# Patient Record
Sex: FEMALE | Race: AMERICAN INDIAN OR ALASKA NATIVE
[De-identification: names, ages, dates, MRNs, and addresses within clinical notes are randomized per-mention and may not be internally consistent; named-entity substitution may affect disease eponyms.]

---

## 2018-01-04 ENCOUNTER — HOSPITAL ENCOUNTER (INPATIENT)
Dept: HOSPITAL 5 - ED | Age: 58
LOS: 4 days | Discharge: HOME | DRG: 871 | End: 2018-01-08
Attending: INTERNAL MEDICINE | Admitting: INTERNAL MEDICINE
Payer: COMMERCIAL

## 2018-01-04 DIAGNOSIS — Z82.49: ICD-10-CM

## 2018-01-04 DIAGNOSIS — Z85.028: ICD-10-CM

## 2018-01-04 DIAGNOSIS — J69.0: ICD-10-CM

## 2018-01-04 DIAGNOSIS — J44.0: ICD-10-CM

## 2018-01-04 DIAGNOSIS — A41.9: Primary | ICD-10-CM

## 2018-01-04 DIAGNOSIS — E87.6: ICD-10-CM

## 2018-01-04 DIAGNOSIS — F17.200: ICD-10-CM

## 2018-01-04 DIAGNOSIS — Z90.3: ICD-10-CM

## 2018-01-04 DIAGNOSIS — J96.01: ICD-10-CM

## 2018-01-04 DIAGNOSIS — Z85.43: ICD-10-CM

## 2018-01-04 DIAGNOSIS — J98.11: ICD-10-CM

## 2018-01-04 DIAGNOSIS — I10: ICD-10-CM

## 2018-01-04 LAB
ALBUMIN SERPL-MCNC: 3.3 G/DL (ref 3.9–5)
ALT SERPL-CCNC: 38 UNITS/L (ref 7–56)
ANISOCYTOSIS BLD QL SMEAR: (no result)
APTT BLD: 29.8 SEC. (ref 24.2–36.6)
BAND NEUTROPHILS # (MANUAL): 0 K/MM3
BILIRUB UR QL STRIP: (no result)
BLOOD UR QL VISUAL: (no result)
BUN SERPL-MCNC: 16 MG/DL (ref 7–17)
BUN/CREAT SERPL: 27 %
CALCIUM SERPL-MCNC: 8.9 MG/DL (ref 8.4–10.2)
HCT VFR BLD CALC: 38.2 % (ref 30.3–42.9)
HEMOLYSIS INDEX: 4
HGB BLD-MCNC: 13 GM/DL (ref 10.1–14.3)
INR PPP: 1.11 (ref 0.87–1.13)
MCH RBC QN AUTO: 28 PG (ref 28–32)
MCHC RBC AUTO-ENTMCNC: 34 % (ref 30–34)
MCV RBC AUTO: 84 FL (ref 79–97)
MUCOUS THREADS #/AREA URNS HPF: (no result) /HPF
MYELOCYTES # (MANUAL): 0 K/MM3
NITRITE UR QL STRIP: (no result)
PH UR STRIP: 5 [PH] (ref 5–7)
PLATELET # BLD: 332 K/MM3 (ref 140–440)
PROMYELOCYTES # (MANUAL): 0 K/MM3
PROT UR STRIP-MCNC: (no result) MG/DL
RBC # BLD AUTO: 4.58 M/MM3 (ref 3.65–5.03)
RBC #/AREA URNS HPF: 3 /HPF (ref 0–6)
TOTAL CELLS COUNTED BLD: 100
UROBILINOGEN UR-MCNC: < 2 MG/DL (ref ?–2)
WBC #/AREA URNS HPF: 3 /HPF (ref 0–6)

## 2018-01-04 PROCEDURE — 93010 ELECTROCARDIOGRAM REPORT: CPT

## 2018-01-04 PROCEDURE — 4A033R1 MEASUREMENT OF ARTERIAL SATURATION, PERIPHERAL, PERCUTANEOUS APPROACH: ICD-10-PCS | Performed by: INTERNAL MEDICINE

## 2018-01-04 PROCEDURE — 94760 N-INVAS EAR/PLS OXIMETRY 1: CPT

## 2018-01-04 PROCEDURE — 85007 BL SMEAR W/DIFF WBC COUNT: CPT

## 2018-01-04 PROCEDURE — 93005 ELECTROCARDIOGRAM TRACING: CPT

## 2018-01-04 PROCEDURE — 85610 PROTHROMBIN TIME: CPT

## 2018-01-04 PROCEDURE — 94640 AIRWAY INHALATION TREATMENT: CPT

## 2018-01-04 PROCEDURE — 82803 BLOOD GASES ANY COMBINATION: CPT

## 2018-01-04 PROCEDURE — 85379 FIBRIN DEGRADATION QUANT: CPT

## 2018-01-04 PROCEDURE — 80053 COMPREHEN METABOLIC PANEL: CPT

## 2018-01-04 PROCEDURE — 96366 THER/PROPH/DIAG IV INF ADDON: CPT

## 2018-01-04 PROCEDURE — 71275 CT ANGIOGRAPHY CHEST: CPT

## 2018-01-04 PROCEDURE — 83880 ASSAY OF NATRIURETIC PEPTIDE: CPT

## 2018-01-04 PROCEDURE — 84484 ASSAY OF TROPONIN QUANT: CPT

## 2018-01-04 PROCEDURE — 81001 URINALYSIS AUTO W/SCOPE: CPT

## 2018-01-04 PROCEDURE — 85730 THROMBOPLASTIN TIME PARTIAL: CPT

## 2018-01-04 PROCEDURE — 36415 COLL VENOUS BLD VENIPUNCTURE: CPT

## 2018-01-04 PROCEDURE — 85027 COMPLETE CBC AUTOMATED: CPT

## 2018-01-04 PROCEDURE — 85025 COMPLETE CBC W/AUTO DIFF WBC: CPT

## 2018-01-04 PROCEDURE — 71045 X-RAY EXAM CHEST 1 VIEW: CPT

## 2018-01-04 PROCEDURE — 87040 BLOOD CULTURE FOR BACTERIA: CPT

## 2018-01-04 PROCEDURE — 96365 THER/PROPH/DIAG IV INF INIT: CPT

## 2018-01-04 PROCEDURE — 80048 BASIC METABOLIC PNL TOTAL CA: CPT

## 2018-01-04 NOTE — EMERGENCY DEPARTMENT REPORT
HPI





- General


Chief Complaint: Dyspnea/Respdistress


Time Seen by Provider: 01/04/18 20:27





- HPI


HPI: 


This is a 57-year-old -American female presents to the emergency 

department by EMS from a [place] urgent care with complaint of shortness of 

breath, productive cough, increased fatigue.  The patient says that she was 

diagnosed with influenza 5 days ago at the same urgent care.  She went back 

there today because of the symptoms stated above.  She says that she had a 

chest x-ray and some other blood work done there and was diagnosed with 

pneumonia.  She was then sent here for further evaluation and treatment.  The 

patient has a past medical history of hypertension.  She is a tobacco smoker 

but says she has not had any cigarette in about 2 weeks.  She denies any 

history of COPD and is not oxygen dependent.  However the patient presents with 

some hypoxia with a pulse ox of 90 while on 2 L nasal cannula.  She denies any 

history of MI, CVA, PE/DVT.  No recent travel or sick contacts at home.  She 

says that she has a primary care physician but has not seen them in quite some 

time.








ED Review of Systems


ROS: 


Stated complaint: GAVINO


Other details as noted in HPI





Comment: All other systems reviewed and negative


Constitutional: other (fatigue).  denies: chills, fever


Eyes: denies: eye pain, eye discharge, vision change


ENT: denies: ear pain, dental pain


Respiratory: cough, shortness of breath, SOB with exertion


Cardiovascular: denies: chest pain, edema


Gastrointestinal: denies: abdominal pain, nausea, diarrhea


Genitourinary: denies: urgency, dysuria, discharge


Musculoskeletal: denies: back pain, joint swelling, arthralgia


Skin: denies: rash, lesions


Neurological: denies: headache, weakness, paresthesias





Physical Exam





- Physical Exam


Vital Signs: 


 Vital Signs











  01/04/18





  19:57


 


Temperature 99.2 F


 


Pulse Rate 90


 


Respiratory 20





Rate 


 


Blood Pressure 139/73


 


O2 Sat by Pulse 87





Oximetry 











Physical Exam: 


GENERAL: The patient is well-developed well-nourished.


HENT: Normocephalic.  Atraumatic.    Patient has moist mucous membranes.


EYES: Extraocular motions are intact.  Pupils equal reactive to light 

bilaterally.


NECK: Supple.  Trachea is midline.


CHEST/LUNGS: Clear to auscultation.  Mild tachypnea but no accessory muscle 

use.  Occasional productive cough heard during examination.  There is no 

respiratory distress noted.


HEART/CARDIOVASCULAR: Regular.  There is no tachycardia.  There is no murmur.


ABDOMEN: Abdomen is soft, nontender.  Patient has normal bowel sounds.  There 

is no abdominal distention.


SKIN: Skin is warm and dry.


NEURO: The patient is awake, alert, and oriented.  The patient is cooperative.  

The patient has no focal neurologic deficits.  The patient has normal speech.


MUSCULOSKELETAL: There is no tenderness or deformity.  There is no limitation 

range of motion.  There is no evidence of acute injury.








ED Course


 Vital Signs











  01/04/18





  19:57


 


Temperature 99.2 F


 


Pulse Rate 90


 


Respiratory 20





Rate 


 


Blood Pressure 139/73


 


O2 Sat by Pulse 87





Oximetry 














- ABG Interpretation


Ph: 7.558


PCO2: 28


PO2: 76


Bicarbonate: 25


Interpretation: respiratory alkalosis, other (hypoxia)





ED Medical Decision Making





- Lab Data


Result diagrams: 


 01/04/18 20:38





 01/04/18 20:45





- EKG Data


-: EKG Interpreted by Me


EKG shows normal: sinus rhythm, axis, intervals (slightly prolonged QTC), QRS 

complexes, ST-T waves


Rate: normal





- EKG Data


When compared to previous EKG there are: previous EKG unavailable


Interpretation: normal EKG (with slightly prolonged QTC)





- Radiology Data


Radiology results: report reviewed, image reviewed


interpreted by me: 


X-ray of the chest shows concern for left lower lobe infiltrate and/or 

pneumonia.  No obvious pleural effusion.  No pneumothorax.








PROCEDURE: CT ANGIO CHEST 





TECHNIQUE: Computerized tomographic angiography of the chest was 


performed after the IV injection of iodinated nonionic contrast 


including image processing. The image data was postprocessed 


using 2-dimensional multiplanar reformatted (MPR) and 


3-dimensional (MIP and/or volume rendered) techniques. 





HISTORY: SOB, elevated dimer 





COMPARISON: Chest radiograph of the same date 





FINDINGS: 


Heart and pericardium: Normal. 





Thoracic aorta: Normal. 





Pulmonary vasculature: Normal. 





Lymph nodes: No enlarged thoracic lymph nodes. 





Lungs: Infiltrate identified in the left lower lung. Slight 


reactive lung tissue with atelectasis right middle and lower 


lung. No effusion or pneumothorax. The central airway is patent.. 





Pleural space: No effusion, thickening, or pneumothorax. 





Musculoskeletal structures: No significant abnormality. 





Upper abdominal structures: No significant abnormality. 





IMPRESSION: 


Minimal infiltrate in the left lower lung with slight reactive 


lung tissue and atelectasis right middle and lower lung. No 


effusion or pneumothorax. 





There is no evidence of pulmonary arterial emboli. 











Transcribed By: [facility] 


Dictated By: BENNETT FANG MD 


Electronically Authenticated By: BENNETT FANG MD 


Signed Date/Time: 01/04/18 2028 





- Medical Decision Making


Patient presents with some shortness of breath, increased fatigue.  She has 

recent positive influenza.  Chest x-ray shows concern for left lower lung 

infiltrate and/or pneumonia.  She has a leukocytosis with left shift.  She had 

some hypoxia when she was first seen in the ER.  D-dimer elevated so CT 

angiography was done that does not show any PE or dissection but may also show 

left lower lobe pneumonia.  Culture sent and patient started on Levaquin.  I 

spoke to [place] and was given permission to keep the patient here for treatment 

by Dr. Ron.  She has been accepted for admission by the hospitalist, Dr. Vital.








- Differential Diagnosis


pneumonia, influenza, PE, URI


Critical Care Time: No


Critical care attestation.: 


If time is entered above; I have spent that time in minutes in the direct care 

of this critically ill patient, excluding procedure time.








ED Disposition


Clinical Impression: 


 Hypoxia, Hypokalemia





Pneumonia


Qualifiers:


 Pneumonia type: due to unspecified organism Laterality: left Lung location: 

lower lobe of lung Qualified Code(s): J18.1 - Lobar pneumonia, unspecified 

organism





Sepsis


Qualifiers:


 Sepsis type: sepsis due to unspecified organism Qualified Code(s): A41.9 - 

Sepsis, unspecified organism





Disposition: DC-09 OP ADMIT IP TO THIS HOSP


Is pt being admited?: Yes


Condition: Fair


Instructions:  Bacterial Pneumonia (ED)


Referrals: 


PASQUALE NORTH MD [Primary Care Provider] - 3-5 Days


Time of Disposition: 03:52

## 2018-01-04 NOTE — XRAY REPORT
FINAL REPORT



PROCEDURE:  XR CHEST 1V AP



TECHNIQUE:  Chest radiograph anteroposterior view. CPT 80370







HISTORY:  Dyspnea 



COMPARISON:  No prior studies are available for comparison.



FINDINGS:  

Heart: Normal.



Mediastinum/Vessels: Mild pulmonary venous congestion is noted..



Lungs/Pleural space: There is diffuse prominence of interstitial

markings. There appears to be an inhomogeneous density in the

left retrocardiac region. Pleural spaces are clear...



Bony thorax: No acute osseous abnormality.



Life support devices: None.



IMPRESSION:  

Mild pulmonary venous congestion.



An inhomogeneous density in the left retrocardiac region may

represent atelectatic versus infiltrative changes. A two view

chest study is recommended whenever the patient's condition

permits..

## 2018-01-05 RX ADMIN — IPRATROPIUM BROMIDE AND ALBUTEROL SULFATE SCH AMPUL: .5; 3 SOLUTION RESPIRATORY (INHALATION) at 20:46

## 2018-01-05 RX ADMIN — LEVOFLOXACIN SCH MLS/HR: 5 INJECTION, SOLUTION INTRAVENOUS at 23:42

## 2018-01-05 RX ADMIN — ENOXAPARIN SODIUM SCH MG: 100 INJECTION SUBCUTANEOUS at 09:30

## 2018-01-05 RX ADMIN — SODIUM CHLORIDE SCH MLS/HR: 0.9 INJECTION, SOLUTION INTRAVENOUS at 05:21

## 2018-01-05 RX ADMIN — IPRATROPIUM BROMIDE AND ALBUTEROL SULFATE SCH AMPUL: .5; 3 SOLUTION RESPIRATORY (INHALATION) at 07:29

## 2018-01-05 RX ADMIN — GUAIFENESIN AND CODEINE PHOSPHATE PRN ML: 100; 10 SOLUTION ORAL at 06:03

## 2018-01-05 RX ADMIN — IPRATROPIUM BROMIDE AND ALBUTEROL SULFATE SCH AMPUL: .5; 3 SOLUTION RESPIRATORY (INHALATION) at 13:26

## 2018-01-05 RX ADMIN — SODIUM CHLORIDE SCH MLS/HR: 0.9 INJECTION, SOLUTION INTRAVENOUS at 16:25

## 2018-01-05 RX ADMIN — GUAIFENESIN AND CODEINE PHOSPHATE PRN ML: 100; 10 SOLUTION ORAL at 19:59

## 2018-01-05 NOTE — HISTORY AND PHYSICAL REPORT
History of Present Illness


Date of examination: 01/05/18


History of present illness: 


58-year-old man with history of stomach cancer, ovarian cancer, emergency room 

with complaints of cough, nonproductive.  Her symptoms started on December 27, 

she was seen at [place] urgent care she was diagnosed with the flu and given 

Tamiflu and cough medications.  Her symptoms did not improve, she had 

generalized weakness, decreased energy and was unable to do her ADLs.  She 

returned to the doctor today, chest x-ray was done which shows pneumonia, she 

was also hypoxic and she was transferred from [place] facility here for admission


Review Of  Systems:


Constitutional: no weight loss


Ears, eyes, nose, mouth and throat: no nasal congestion, no nasal discharge, no 

sinus pressure, blurry vision, diplopia


Neck: No neck pain or rigidity.


Cardiovascular: No chest pain,  palpitations


Respiratory: + shortness of breath, cough


Gastrointestinal: No abdominal pain, hematochezia


Genitourinary : no dysuria, frequency , hematuria


Musculoskeletal: no muscle ache 


Integumentary: no rash, no pruritis


Neurological: no parathesias, focal weakness


Endocrine: no cold or heat intolerance, no polyuria or polydipsia


Hematologic/Lymphatic: no easy bruising, no easy bleeding, no gland swelling


Allergic/Immunologic: no urticaria, no angioedema.





PAST MEDICAL HISTORY:stomach cancer, ovarian cancer





PAST SURGICAL HISTORY: Partial gastrectomy, carpal tunnel release





FAMILY HISTORY: Hypertension





SOCIAL HISTORY: Patient has not smoked since she got sick, social alcohol, no 

drugs








Medications and Allergies


 Allergies











Allergy/AdvReac Type Severity Reaction Status Date / Time


 


No Known Allergies Allergy   Verified 01/05/18 03:01











Active Meds: 


Active Medications





Enoxaparin Sodium (Lovenox)  30 mg SUB-Q QDAY LUCIUS











Exam





- Physical Exam


Narrative exam: 


Gen. appearance: Patient lying in bed in no acute distress


HEENT: Normocephalic/atraumatic, pupils equal round reactive to light, extra 

occular movement intact, no scleral icterus, no JVD or thyromegaly or nodule, 

neck is supple, mucous membrane moist, no erythema or exudate


Heart: S1-S2, regular rate and rhythm


Lungs: Crackles breathing comfortable


Abdomen: Positive bowel sounds, nontender, nondistended, no organomegaly


Extremities: No edema, cyanosis, clubbing


Neuro:: Oriented 3 , cranial nerves II-12 intact, speech, motor intact


Skin: No rash, nodules, warm dry








- Constitutional


Vitals: 


 











Temp Pulse Resp BP Pulse Ox


 


 98.4 F   93 H  24   131/69   95 


 


 01/05/18 00:56  01/05/18 03:38  01/05/18 03:38  01/05/18 02:30  01/05/18 03:38














Results





- Labs


CBC & Chem 7: 


 01/04/18 20:38





 01/04/18 20:45


Labs: 


 Abnormal lab results











  01/04/18 01/04/18 01/04/18 Range/Units





  20:38 20:44 20:45 


 


WBC  15.1 H    (4.5-11.0)  K/mm3


 


Seg Neuts % (Manual)  90.0 H    (40.0-70.0)  %


 


Lymphocytes % (Manual)  7.0 L    (13.4-35.0)  %


 


Seg Neutrophils # Man  13.6 H    (1.8-7.7)  K/mm3


 


Lymphocytes # (Manual)  1.1 L    (1.2-5.4)  K/mm3


 


D-Dimer   758.89 H   (0-234)  ng/mlDDU


 


POC ABG pH     (7.35-7.45)  


 


POC ABG pCO2     (35-45)  


 


POC ABG pO2     ()  


 


Potassium    3.0 L  (3.6-5.0)  mmol/L


 


Chloride    92.3 L  ()  mmol/L


 


Creatinine    0.6 L  (0.7-1.2)  mg/dL


 


Glucose    129 H  ()  mg/dL


 


AST    44 H  (5-40)  units/L


 


Albumin    3.3 L  (3.9-5)  g/dL














  01/04/18 Range/Units





  21:08 


 


WBC   (4.5-11.0)  K/mm3


 


Seg Neuts % (Manual)   (40.0-70.0)  %


 


Lymphocytes % (Manual)   (13.4-35.0)  %


 


Seg Neutrophils # Man   (1.8-7.7)  K/mm3


 


Lymphocytes # (Manual)   (1.2-5.4)  K/mm3


 


D-Dimer   (0-234)  ng/mlDDU


 


POC ABG pH  7.558 H  (7.35-7.45)  


 


POC ABG pCO2  28.6 L  (35-45)  


 


POC ABG pO2  76 L  ()  


 


Potassium   (3.6-5.0)  mmol/L


 


Chloride   ()  mmol/L


 


Creatinine   (0.7-1.2)  mg/dL


 


Glucose   ()  mg/dL


 


AST   (5-40)  units/L


 


Albumin   (3.9-5)  g/dL














- Imaging and Cardiology


EKG: image reviewed


Chest x-ray: image reviewed





Assessment and Plan


Assessment


Sepsis


Pneumonia


History of ovarian stomach cancer





Plan


Admit to medicine


Start IV fluids, IV antibiotic, follow cultures


Continued appropriate outpatient medications


DVT Prophylaxis

## 2018-01-05 NOTE — CAT SCAN REPORT
FINAL REPORT



PROCEDURE:  CT ANGIO CHEST



TECHNIQUE:  Computerized tomographic angiography of the chest was

performed after the IV injection of iodinated nonionic contrast

including image processing. The image data was postprocessed

using 2-dimensional multiplanar reformatted (MPR) and

3-dimensional (MIP and/or volume rendered) techniques. 



HISTORY:  SOB, elevated dimer 



COMPARISON:  Chest radiograph of the same date



FINDINGS:  

Heart and pericardium: Normal.



Thoracic aorta: Normal.



Pulmonary vasculature: Normal.



Lymph nodes: No enlarged thoracic lymph nodes.



Lungs: Infiltrate identified in the left lower lung. Slight

reactive lung tissue with atelectasis right middle and lower

lung. No effusion or pneumothorax. The central airway is patent..



Pleural space: No effusion, thickening, or pneumothorax.



Musculoskeletal structures: No significant abnormality.



Upper abdominal structures: No significant abnormality.



IMPRESSION:  

Minimal infiltrate in the left lower lung with slight reactive

lung tissue and atelectasis right middle and lower lung. No

effusion or pneumothorax.



There is no evidence of pulmonary arterial emboli.

## 2018-01-05 NOTE — PROGRESS NOTE
Assessment and Plan


Assessment and plan: 


Patient is 58-year-old woman with history of hypertension, ovarian and stomach 

cancer in remission who present from [place] urgent care with sob and cough.  

Patient had the Flu and was treated with Tamiflu, 1-2 weeks prior to 

presentation.





Chest x-ray reported as mild pulmonary vascular congestion


CTA of the chest reported as minimal infiltrate in the left lower lung with 

slight reactive lung tissue and atelectasis right middle and lower lobe.  No 

effusion or pneumothorax, there is no evidence of pulmonary artery emboli.





-Acute hypoxic respiratory failure, patient was on 85% on room air at rest: 

Treatment will oxygen


-Sepsis bilateral aspiration pneumonia: Careful with IV fluids due to venous 

pulmonary congestion, follow cultures


-Left lower lobe pneumonia:  Treatment with antibiotics 


-Hypokalemia: Replace and recheck in a.m.


-DVT prophylaxis: Subcutaneous Lovenox











History


Interval history: 


Patient was seen and examined.  Follow-up on current diagnosis.  Overnight 

uneventful.  Patient denies any chest pain, shortness breath, nausea/vomiting 

or severe headaches.  Imaging, nursing note, chart, labs and old chart 

reviewed.  Discussed with patient.








Hospitalist Physical





- Physical exam


Narrative exam: 


GEN: WDWN, NAD, AWAKE, ALERT, ORIENTATED 3


HEENT: NCAT, EOMI, PERRL, OP Clear


NECK: supple, no adenopathy, no thyromegaly, no JVD


CVS/HEART: RRR, NORMAL S1S2, NO JVD, pulses present bilaterally


CHEST/LUNGS: Crackles bilaterally, Symmetrical chest expansion, good air entry 

bilaterally


GI/Abdomen: soft, NTND, good bowel sounds, no guarding or rebound


/Bladder: no suprapubic tenderness, no CVA or paraspinal tenderness


EXT/Skin: no c/c/e, no obvious rash


MSK: FROM x 4


Neuro: CN 2-12 grossly intact, no new focal deficits


Psych: calm








- Constitutional


Vitals: 


 











Temp Pulse Resp BP Pulse Ox


 


 98.5 F   88   18   138/59   85 


 


 01/05/18 08:21  01/05/18 08:21  01/05/18 08:21  01/05/18 08:21  01/05/18 08:21














Results





- Labs


CBC & Chem 7: 


 01/04/18 20:38





 01/04/18 20:45


Labs: 


 Laboratory Last Values











WBC  15.1 K/mm3 (4.5-11.0)  H  01/04/18  20:38    


 


RBC  4.58 M/mm3 (3.65-5.03)   01/04/18  20:38    


 


Hgb  13.0 gm/dl (10.1-14.3)   01/04/18  20:38    


 


Hct  38.2 % (30.3-42.9)   01/04/18  20:38    


 


MCV  84 fl (79-97)   01/04/18  20:38    


 


MCH  28 pg (28-32)   01/04/18  20:38    


 


MCHC  34 % (30-34)   01/04/18  20:38    


 


RDW  13.5 % (13.2-15.2)   01/04/18  20:38    


 


Plt Count  332 K/mm3 (140-440)   01/04/18  20:38    


 


Add Manual Diff  Complete   01/04/18  20:38    


 


Total Counted  100   01/04/18  20:38    


 


Seg Neutrophils %  Np   01/04/18  20:38    


 


Seg Neuts % (Manual)  90.0 % (40.0-70.0)  H  01/04/18  20:38    


 


Band Neutrophils %  0 %  01/04/18  20:38    


 


Lymphocytes % (Manual)  7.0 % (13.4-35.0)  L  01/04/18  20:38    


 


Reactive Lymphs % (Man)  0 %  01/04/18  20:38    


 


Monocytes % (Manual)  3.0 % (0.0-7.3)   01/04/18  20:38    


 


Eosinophils % (Manual)  0 % (0.0-4.3)   01/04/18  20:38    


 


Basophils % (Manual)  0 % (0.0-1.8)   01/04/18  20:38    


 


Metamyelocytes %  0 %  01/04/18  20:38    


 


Myelocytes %  0 %  01/04/18  20:38    


 


Promyelocytes %  0 %  01/04/18  20:38    


 


Blast Cells %  0 %  01/04/18  20:38    


 


Nucleated RBC %  Not Reportable   01/04/18  20:38    


 


Seg Neutrophils # Man  13.6 K/mm3 (1.8-7.7)  H  01/04/18  20:38    


 


Band Neutrophils #  0.0 K/mm3  01/04/18  20:38    


 


Lymphocytes # (Manual)  1.1 K/mm3 (1.2-5.4)  L  01/04/18  20:38    


 


Abs React Lymphs (Man)  0.0 K/mm3  01/04/18  20:38    


 


Monocytes # (Manual)  0.5 K/mm3 (0.0-0.8)   01/04/18  20:38    


 


Eosinophils # (Manual)  0.0 K/mm3 (0.0-0.4)   01/04/18  20:38    


 


Basophils # (Manual)  0.0 K/mm3 (0.0-0.1)   01/04/18  20:38    


 


Metamyelocytes #  0.0 K/mm3  01/04/18  20:38    


 


Myelocytes #  0.0 K/mm3  01/04/18  20:38    


 


Promyelocytes #  0.0 K/mm3  01/04/18  20:38    


 


Blast Cells #  0.0 K/mm3  01/04/18  20:38    


 


WBC Morphology  Not Reportable   01/04/18  20:38    


 


Hypersegmented Neuts  Not Reportable   01/04/18  20:38    


 


Hyposegmented Neuts  Not Reportable   01/04/18  20:38    


 


Hypogranular Neuts  Not Reportable   01/04/18  20:38    


 


Smudge Cells  Not Reportable   01/04/18  20:38    


 


Toxic Granulation  Not Reportable   01/04/18  20:38    


 


Toxic Vacuolation  Not Reportable   01/04/18  20:38    


 


Dohle Bodies  Not Reportable   01/04/18  20:38    


 


Pelger-Huet Anomaly  Not Reportable   01/04/18  20:38    


 


Jimbo Rods  Not Reportable   01/04/18  20:38    


 


Platelet Estimate  Appears normal   01/04/18  20:38    


 


Clumped Platelets  Not Reportable   01/04/18  20:38    


 


Plt Clumps, EDTA  Not Reportable   01/04/18  20:38    


 


Large Platelets  Not Reportable   01/04/18  20:38    


 


Giant Platelets  Not Reportable   01/04/18  20:38    


 


Platelet Satelliting  Not Reportable   01/04/18  20:38    


 


Plt Morphology Comment  Not Reportable   01/04/18  20:38    


 


RBC Morphology  Not Reportable   01/04/18  20:38    


 


Dimorphic RBCs  Not Reportable   01/04/18  20:38    


 


Polychromasia  Not Reportable   01/04/18  20:38    


 


Hypochromasia  Not Reportable   01/04/18  20:38    


 


Poikilocytosis  Not Reportable   01/04/18  20:38    


 


Anisocytosis  Rare   01/04/18  20:38    


 


Microcytosis  Not Reportable   01/04/18  20:38    


 


Macrocytosis  Not Reportable   01/04/18  20:38    


 


Spherocytes  Not Reportable   01/04/18  20:38    


 


Pappenheimer Bodies  Not Reportable   01/04/18  20:38    


 


Sickle Cells  Not Reportable   01/04/18  20:38    


 


Target Cells  Not Reportable   01/04/18  20:38    


 


Tear Drop Cells  Not Reportable   01/04/18  20:38    


 


Ovalocytes  Not Reportable   01/04/18  20:38    


 


Helmet Cells  Not Reportable   01/04/18  20:38    


 


Guardado-Conway Bodies  Not Reportable   01/04/18  20:38    


 


Cabot Rings  Not Reportable   01/04/18  20:38    


 


Luly Cells  Not Reportable   01/04/18  20:38    


 


Bite Cells  Not Reportable   01/04/18  20:38    


 


Crenated Cell  Not Reportable   01/04/18  20:38    


 


Elliptocytes  Not Reportable   01/04/18  20:38    


 


Acanthocytes (Spur)  Not Reportable   01/04/18  20:38    


 


Rouleaux  Not Reportable   01/04/18  20:38    


 


Hemoglobin C Crystals  Not Reportable   01/04/18  20:38    


 


Schistocytes  Not Reportable   01/04/18  20:38    


 


Malaria parasites  Not Reportable   01/04/18  20:38    


 


Yayo Bodies  Not Reportable   01/04/18  20:38    


 


Hem Pathologist Commnt  No   01/04/18  20:38    


 


PT  14.9 Sec. (12.2-14.9)   01/04/18  20:44    


 


INR  1.11  (0.87-1.13)   01/04/18  20:44    


 


APTT  29.8 Sec. (24.2-36.6)   01/04/18  20:44    


 


D-Dimer  758.89 ng/mlDDU (0-234)  H  01/04/18  20:44    


 


POC ABG pH  7.558  (7.35-7.45)  H  01/04/18  21:08    


 


POC ABG pCO2  28.6  (35-45)  L  01/04/18  21:08    


 


POC ABG pO2  76  ()  L  01/04/18  21:08    


 


POC ABG HCO3  25.5   01/04/18  21:08    


 


POC ABG Total CO2  26   01/04/18  21:08    


 


POC ABG O2 Sat  97   01/04/18  21:08    


 


POC ABG Base Excess  3   01/04/18  21:08    


 


FiO2  28 %  01/04/18  21:08    


 


Sodium  138 mmol/L (137-145)   01/04/18  20:45    


 


Potassium  3.0 mmol/L (3.6-5.0)  L  01/04/18  20:45    


 


Chloride  92.3 mmol/L ()  L  01/04/18  20:45    


 


Carbon Dioxide  24 mmol/L (22-30)   01/04/18  20:45    


 


Anion Gap  25 mmol/L  01/04/18  20:45    


 


BUN  16 mg/dL (7-17)   01/04/18  20:45    


 


Creatinine  0.6 mg/dL (0.7-1.2)  L  01/04/18  20:45    


 


Estimated GFR  > 60 ml/min  01/04/18  20:45    


 


BUN/Creatinine Ratio  27 %  01/04/18  20:45    


 


Glucose  129 mg/dL ()  H  01/04/18  20:45    


 


Calcium  8.9 mg/dL (8.4-10.2)   01/04/18  20:45    


 


Total Bilirubin  0.50 mg/dL (0.1-1.2)   01/04/18  20:45    


 


AST  44 units/L (5-40)  H  01/04/18  20:45    


 


ALT  38 units/L (7-56)   01/04/18  20:45    


 


Alkaline Phosphatase  76 units/L ()   01/04/18  20:45    


 


Troponin T  < 0.010 ng/mL (0.00-0.029)   01/05/18  02:16    


 


NT-Pro-B Natriuret Pep  62.68 pg/mL (0-900)   01/04/18  20:45    


 


Total Protein  7.6 g/dL (6.3-8.2)   01/04/18  20:45    


 


Albumin  3.3 g/dL (3.9-5)  L  01/04/18  20:45    


 


Albumin/Globulin Ratio  0.8 %  01/04/18  20:45    


 


Urine Color  Yellow  (Yellow)   01/04/18  Unknown


 


Urine Turbidity  Clear  (Clear)   01/04/18  Unknown


 


Urine pH  5.0  (5.0-7.0)   01/04/18  Unknown


 


Ur Specific Gravity  1.018  (1.003-1.030)   01/04/18  Unknown


 


Urine Protein  <15 mg/dl mg/dL (Negative)   01/04/18  Unknown


 


Urine Glucose (UA)  Neg mg/dL (Negative)   01/04/18  Unknown


 


Urine Ketones  Neg mg/dL (Negative)   01/04/18  Unknown


 


Urine Blood  Sm  (Negative)   01/04/18  Unknown


 


Urine Nitrite  Neg  (Negative)   01/04/18  Unknown


 


Urine Bilirubin  Neg  (Negative)   01/04/18  Unknown


 


Urine Urobilinogen  < 2.0 mg/dL (<2.0)   01/04/18  Unknown


 


Ur Leukocyte Esterase  Tr  (Negative)   01/04/18  Unknown


 


Urine WBC (Auto)  3.0 /HPF (0.0-6.0)   01/04/18  Unknown


 


Urine RBC (Auto)  3.0 /HPF (0.0-6.0)   01/04/18  Unknown


 


U Epithel Cells (Auto)  2.0 /HPF (0-13.0)   01/04/18  Unknown


 


Urine Mucus  Few /HPF  01/04/18  Unknown

## 2018-01-06 LAB
BAND NEUTROPHILS # (MANUAL): 0.9 K/MM3
BUN SERPL-MCNC: 17 MG/DL (ref 7–17)
BUN/CREAT SERPL: 28 %
CALCIUM SERPL-MCNC: 8.6 MG/DL (ref 8.4–10.2)
HCT VFR BLD CALC: 32.6 % (ref 30.3–42.9)
HEMOLYSIS INDEX: 2
HGB BLD-MCNC: 10.9 GM/DL (ref 10.1–14.3)
MCH RBC QN AUTO: 28 PG (ref 28–32)
MCHC RBC AUTO-ENTMCNC: 33 % (ref 30–34)
MCV RBC AUTO: 84 FL (ref 79–97)
MYELOCYTES # (MANUAL): 0 K/MM3
PLATELET # BLD: 360 K/MM3 (ref 140–440)
PROMYELOCYTES # (MANUAL): 0 K/MM3
RBC # BLD AUTO: 3.88 M/MM3 (ref 3.65–5.03)
TOTAL CELLS COUNTED BLD: 100

## 2018-01-06 RX ADMIN — IPRATROPIUM BROMIDE AND ALBUTEROL SULFATE SCH AMPUL: .5; 3 SOLUTION RESPIRATORY (INHALATION) at 14:06

## 2018-01-06 RX ADMIN — IPRATROPIUM BROMIDE AND ALBUTEROL SULFATE SCH AMPUL: .5; 3 SOLUTION RESPIRATORY (INHALATION) at 21:33

## 2018-01-06 RX ADMIN — SODIUM CHLORIDE SCH MLS/HR: 0.9 INJECTION, SOLUTION INTRAVENOUS at 03:45

## 2018-01-06 RX ADMIN — IPRATROPIUM BROMIDE AND ALBUTEROL SULFATE SCH AMPUL: .5; 3 SOLUTION RESPIRATORY (INHALATION) at 02:19

## 2018-01-06 RX ADMIN — LEVOFLOXACIN SCH MLS/HR: 5 INJECTION, SOLUTION INTRAVENOUS at 23:02

## 2018-01-06 RX ADMIN — GUAIFENESIN AND CODEINE PHOSPHATE PRN ML: 100; 10 SOLUTION ORAL at 08:26

## 2018-01-06 RX ADMIN — GUAIFENESIN AND CODEINE PHOSPHATE PRN ML: 100; 10 SOLUTION ORAL at 16:57

## 2018-01-06 RX ADMIN — IPRATROPIUM BROMIDE AND ALBUTEROL SULFATE SCH AMPUL: .5; 3 SOLUTION RESPIRATORY (INHALATION) at 07:31

## 2018-01-06 RX ADMIN — GUAIFENESIN AND CODEINE PHOSPHATE PRN ML: 100; 10 SOLUTION ORAL at 03:42

## 2018-01-06 RX ADMIN — ENOXAPARIN SODIUM SCH MG: 100 INJECTION SUBCUTANEOUS at 10:10

## 2018-01-06 NOTE — PROGRESS NOTE
Assessment and Plan


Assessment and plan: 


Patient is 58-year-old woman with history of hypertension, ovarian and stomach 

cancer in remission who present from [place] [facility] with sob and cough.  

Patient had the Flu and was treated with Tamiflu, 1-2 weeks prior to 

presentation.





Chest x-ray reported as mild pulmonary vascular congestion


CTA of the chest reported as minimal infiltrate in the left lower lung with 

slight reactive lung tissue and atelectasis right middle and lower lobe.  No 

effusion or pneumothorax, there is no evidence of pulmonary artery emboli.





-Acute hypoxic respiratory failure, patient was on 85% on room air at rest: 

Treatment will oxygen


-Sepsis bilateral aspiration pneumonia: Careful with IV fluids due to venous 

pulmonary congestion, follow cultures


-Left lower lobe pneumonia:  Treatment with antibiotics 


-Hypokalemia: Replace and recheck in a.m.


-DVT prophylaxis: Subcutaneous Lovenox





1/5/18: 85% ra


1/6/18: 87% ra, will stop fluids, give one dose of 1v lasix lasix and recheck 

pulse ox tomorrow, if off o2 then d/c home or arrange for home o2 on monday





History


Interval history: 


Patient was seen and examined.  Follow-up on current diagnosis.  Overnight 

uneventful.  Patient denies any chest pain, shortness breath, nausea/vomiting 

or severe headaches.  Imaging, nursing note, chart, labs and old chart 

reviewed.  Discussed with patient.








Hospitalist Physical





- Physical exam


Narrative exam: 


GEN: WDWN, NAD, AWAKE, ALERT, ORIENTATED 3


HEENT: NCAT, EOMI, PERRL, OP Clear


NECK: supple, no adenopathy, no thyromegaly, no JVD


CVS/HEART: RRR, NORMAL S1S2, NO JVD, pulses present bilaterally


CHEST/LUNGS: Crackles bilaterally, Symmetrical chest expansion, good air entry 

bilaterally


GI/Abdomen: soft, NTND, good bowel sounds, no guarding or rebound


/Bladder: no suprapubic tenderness, no CVA or paraspinal tenderness


EXT/Skin: no c/c/e, no obvious rash


MSK: FROM x 4


Neuro: CN 2-12 grossly intact, no new focal deficits


Psych: calm








- Constitutional


Vitals: 


 











Temp Pulse Resp BP Pulse Ox


 


 98.4 F   83   18   124/68   97 


 


 01/06/18 08:56  01/06/18 08:56  01/06/18 08:56  01/06/18 08:56  01/06/18 10:41














Results





- Labs


CBC & Chem 7: 


 01/06/18 06:31





 01/06/18 06:31


Labs: 


 Laboratory Last Values











WBC  11.1 K/mm3 (4.5-11.0)  H  01/06/18  06:31    


 


RBC  3.88 M/mm3 (3.65-5.03)   01/06/18  06:31    


 


Hgb  10.9 gm/dl (10.1-14.3)   01/06/18  06:31    


 


Hct  32.6 % (30.3-42.9)   01/06/18  06:31    


 


MCV  84 fl (79-97)   01/06/18  06:31    


 


MCH  28 pg (28-32)   01/06/18  06:31    


 


MCHC  33 % (30-34)   01/06/18  06:31    


 


RDW  13.8 % (13.2-15.2)   01/06/18  06:31    


 


Plt Count  360 K/mm3 (140-440)   01/06/18  06:31    


 


Add Manual Diff  Complete   01/06/18  06:31    


 


Total Counted  100   01/06/18  06:31    


 


Seg Neutrophils %  Np   01/04/18  20:38    


 


Seg Neuts % (Manual)  56.0 % (40.0-70.0)   01/06/18  06:31    


 


Band Neutrophils %  8.0 %  01/06/18  06:31    


 


Lymphocytes % (Manual)  19.0 % (13.4-35.0)   01/06/18  06:31    


 


Reactive Lymphs % (Man)  0 %  01/06/18  06:31    


 


Monocytes % (Manual)  15.0 % (0.0-7.3)  H  01/06/18  06:31    


 


Eosinophils % (Manual)  0 % (0.0-4.3)   01/06/18  06:31    


 


Basophils % (Manual)  0 % (0.0-1.8)   01/06/18  06:31    


 


Metamyelocytes %  2.0 %  01/06/18  06:31    


 


Myelocytes %  0 %  01/06/18  06:31    


 


Promyelocytes %  0 %  01/06/18  06:31    


 


Blast Cells %  0 %  01/06/18  06:31    


 


Nucleated RBC %  Not Reportable   01/06/18  06:31    


 


Seg Neutrophils # Man  6.2 K/mm3 (1.8-7.7)   01/06/18  06:31    


 


Band Neutrophils #  0.9 K/mm3  01/06/18  06:31    


 


Lymphocytes # (Manual)  2.1 K/mm3 (1.2-5.4)   01/06/18  06:31    


 


Abs React Lymphs (Man)  0.0 K/mm3  01/06/18  06:31    


 


Monocytes # (Manual)  1.7 K/mm3 (0.0-0.8)  H  01/06/18  06:31    


 


Eosinophils # (Manual)  0.0 K/mm3 (0.0-0.4)   01/06/18  06:31    


 


Basophils # (Manual)  0.0 K/mm3 (0.0-0.1)   01/06/18  06:31    


 


Metamyelocytes #  0.2 K/mm3  01/06/18  06:31    


 


Myelocytes #  0.0 K/mm3  01/06/18  06:31    


 


Promyelocytes #  0.0 K/mm3  01/06/18  06:31    


 


Blast Cells #  0.0 K/mm3  01/06/18  06:31    


 


WBC Morphology  Not Reportable   01/06/18  06:31    


 


Hypersegmented Neuts  Not Reportable   01/06/18  06:31    


 


Hyposegmented Neuts  Not Reportable   01/06/18  06:31    


 


Hypogranular Neuts  Not Reportable   01/06/18  06:31    


 


Smudge Cells  Not Reportable   01/06/18  06:31    


 


Toxic Granulation  Not Reportable   01/06/18  06:31    


 


Toxic Vacuolation  Not Reportable   01/06/18  06:31    


 


Dohle Bodies  Not Reportable   01/06/18  06:31    


 


Pelger-Huet Anomaly  Not Reportable   01/06/18  06:31    


 


Jimbo Rods  Not Reportable   01/06/18  06:31    


 


Platelet Estimate  Appears normal   01/06/18  06:31    


 


Clumped Platelets  Not Reportable   01/06/18  06:31    


 


Plt Clumps, EDTA  Not Reportable   01/06/18  06:31    


 


Large Platelets  Few   01/06/18  06:31    


 


Giant Platelets  Not Reportable   01/06/18  06:31    


 


Platelet Satelliting  Not Reportable   01/06/18  06:31    


 


Plt Morphology Comment  Not Reportable   01/06/18  06:31    


 


RBC Morphology  Not Reportable   01/06/18  06:31    


 


Dimorphic RBCs  Not Reportable   01/06/18  06:31    


 


Polychromasia  Not Reportable   01/06/18  06:31    


 


Hypochromasia  Not Reportable   01/06/18  06:31    


 


Poikilocytosis  Not Reportable   01/06/18  06:31    


 


Anisocytosis  Few   01/06/18  06:31    


 


Microcytosis  Not Reportable   01/06/18  06:31    


 


Macrocytosis  Not Reportable   01/06/18  06:31    


 


Spherocytes  Few   01/06/18  06:31    


 


Pappenheimer Bodies  Not Reportable   01/06/18  06:31    


 


Sickle Cells  Not Reportable   01/06/18  06:31    


 


Target Cells  Not Reportable   01/06/18  06:31    


 


Tear Drop Cells  Not Reportable   01/06/18  06:31    


 


Ovalocytes  Not Reportable   01/06/18  06:31    


 


Helmet Cells  Not Reportable   01/06/18  06:31    


 


[place] Bodies  Not Reportable   01/06/18  06:31    


 


Cabot Rings  Not Reportable   01/06/18  06:31    


 


[place] Cells  Not Reportable   01/06/18  06:31    


 


Bite Cells  Not Reportable   01/06/18  06:31    


 


Crenated Cell  Not Reportable   01/06/18  06:31    


 


Elliptocytes  Not Reportable   01/06/18  06:31    


 


Acanthocytes (Spur)  Not Reportable   01/06/18  06:31    


 


Rouleaux  Not Reportable   01/06/18  06:31    


 


Hemoglobin C Crystals  Not Reportable   01/06/18  06:31    


 


Schistocytes  Not Reportable   01/06/18  06:31    


 


Malaria parasites  Not Reportable   01/06/18  06:31    


 


[place] Bodies  Not Reportable   01/06/18  06:31    


 


Hem Pathologist Commnt  No   01/06/18  06:31    


 


PT  14.9 Sec. (12.2-14.9)   01/04/18  20:44    


 


INR  1.11  (0.87-1.13)   01/04/18  20:44    


 


APTT  29.8 Sec. (24.2-36.6)   01/04/18  20:44    


 


D-Dimer  758.89 ng/mlDDU (0-234)  H  01/04/18  20:44    


 


POC ABG pH  7.558  (7.35-7.45)  H  01/04/18  21:08    


 


POC ABG pCO2  28.6  (35-45)  L  01/04/18  21:08    


 


POC ABG pO2  76  ()  L  01/04/18  21:08    


 


POC ABG HCO3  25.5   01/04/18  21:08    


 


POC ABG Total CO2  26   01/04/18  21:08    


 


POC ABG O2 Sat  97   01/04/18  21:08    


 


POC ABG Base Excess  3   01/04/18  21:08    


 


FiO2  28 %  01/04/18  21:08    


 


Sodium  143 mmol/L (137-145)   01/06/18  06:31    


 


Potassium  3.3 mmol/L (3.6-5.0)  L  01/06/18  06:31    


 


Chloride  103.3 mmol/L ()   01/06/18  06:31    


 


Carbon Dioxide  26 mmol/L (22-30)   01/06/18  06:31    


 


Anion Gap  17 mmol/L  01/06/18  06:31    


 


BUN  17 mg/dL (7-17)   01/06/18  06:31    


 


Creatinine  0.6 mg/dL (0.7-1.2)  L  01/06/18  06:31    


 


Estimated GFR  > 60 ml/min  01/06/18  06:31    


 


BUN/Creatinine Ratio  28 %  01/06/18  06:31    


 


Glucose  89 mg/dL ()   01/06/18  06:31    


 


Calcium  8.6 mg/dL (8.4-10.2)   01/06/18  06:31    


 


Total Bilirubin  0.50 mg/dL (0.1-1.2)   01/04/18  20:45    


 


AST  44 units/L (5-40)  H  01/04/18  20:45    


 


ALT  38 units/L (7-56)   01/04/18  20:45    


 


Alkaline Phosphatase  76 units/L ()   01/04/18  20:45    


 


Troponin T  < 0.010 ng/mL (0.00-0.029)   01/05/18  02:16    


 


NT-Pro-B Natriuret Pep  62.68 pg/mL (0-900)   01/04/18  20:45    


 


Total Protein  7.6 g/dL (6.3-8.2)   01/04/18  20:45    


 


Albumin  3.3 g/dL (3.9-5)  L  01/04/18  20:45    


 


Albumin/Globulin Ratio  0.8 %  01/04/18  20:45    


 


Urine Color  Yellow  (Yellow)   01/04/18  Unknown


 


Urine Turbidity  Clear  (Clear)   01/04/18  Unknown


 


Urine pH  5.0  (5.0-7.0)   01/04/18  Unknown


 


Ur Specific Gravity  1.018  (1.003-1.030)   01/04/18  Unknown


 


Urine Protein  <15 mg/dl mg/dL (Negative)   01/04/18  Unknown


 


Urine Glucose (UA)  Neg mg/dL (Negative)   01/04/18  Unknown


 


Urine Ketones  Neg mg/dL (Negative)   01/04/18  Unknown


 


Urine Blood  Sm  (Negative)   01/04/18  Unknown


 


Urine Nitrite  Neg  (Negative)   01/04/18  Unknown


 


Urine Bilirubin  Neg  (Negative)   01/04/18  Unknown


 


Urine Urobilinogen  < 2.0 mg/dL (<2.0)   01/04/18  Unknown


 


Ur Leukocyte Esterase  Tr  (Negative)   01/04/18  Unknown


 


Urine WBC (Auto)  3.0 /HPF (0.0-6.0)   01/04/18  Unknown


 


Urine RBC (Auto)  3.0 /HPF (0.0-6.0)   01/04/18  Unknown


 


U Epithel Cells (Auto)  2.0 /HPF (0-13.0)   01/04/18  Unknown


 


Urine Mucus  Few /HPF  01/04/18  Unknown

## 2018-01-07 LAB
BUN SERPL-MCNC: 12 MG/DL (ref 7–17)
BUN/CREAT SERPL: 20 %
CALCIUM SERPL-MCNC: 9.1 MG/DL (ref 8.4–10.2)
HCT VFR BLD CALC: 35.8 % (ref 30.3–42.9)
HEMOLYSIS INDEX: 5
HGB BLD-MCNC: 11.5 GM/DL (ref 10.1–14.3)
MCH RBC QN AUTO: 27 PG (ref 28–32)
MCHC RBC AUTO-ENTMCNC: 32 % (ref 30–34)
MCV RBC AUTO: 84 FL (ref 79–97)
PLATELET # BLD: 453 K/MM3 (ref 140–440)
RBC # BLD AUTO: 4.26 M/MM3 (ref 3.65–5.03)

## 2018-01-07 RX ADMIN — ENOXAPARIN SODIUM SCH MG: 100 INJECTION SUBCUTANEOUS at 10:46

## 2018-01-07 RX ADMIN — IPRATROPIUM BROMIDE AND ALBUTEROL SULFATE SCH AMPUL: .5; 3 SOLUTION RESPIRATORY (INHALATION) at 08:13

## 2018-01-07 RX ADMIN — IPRATROPIUM BROMIDE AND ALBUTEROL SULFATE SCH AMPUL: .5; 3 SOLUTION RESPIRATORY (INHALATION) at 20:30

## 2018-01-07 RX ADMIN — IPRATROPIUM BROMIDE AND ALBUTEROL SULFATE SCH AMPUL: .5; 3 SOLUTION RESPIRATORY (INHALATION) at 15:57

## 2018-01-07 NOTE — PROGRESS NOTE
Assessment and Plan


Assessment and plan: 


Patient is 58-year-old woman with history of hypertension, smoker (quit last 

month) ovarian and stomach cancer in remission who present from [facility] with sob and cough.  Patient had the Flu and was treated with Tamiflu, 1-2 

weeks prior to presentation.





Chest x-ray reported as mild pulmonary vascular congestion


CTA of the chest reported as minimal infiltrate in the left lower lung with 

slight reactive lung tissue and atelectasis right middle and lower lobe.  No 

effusion or pneumothorax, there is no evidence of pulmonary artery emboli.





-Acute hypoxic respiratory failure, patient was on 85% on room air at rest: 

Treatment will oxygen


-Sepsis bilateral aspiration pneumonia: Careful with IV fluids due to venous 

pulmonary congestion, follow cultures


-Left lower lobe pneumonia:  Treatment with antibiotics 


-Hypokalemia: Replace and recheck in a.m.


-DVT prophylaxis: Subcutaneous Lovenox





1/5/18: 85% ra


1/6/18: 87% ra, will stop fluids, give one dose of 1v lasix lasix and recheck 

pulse ox tomorrow, if off o2 then d/c home or arrange for home o2 on monday 1/7/18: SPO2 with O2 92%, without O2 87%, with out O2 on ambulation 85%, 

ambulation with 2lts O2 91%, home tomorrow with O2 suspect component of Asthma. 

She needs testing for COPD





History


Interval history: 


Patient was seen and examined.  Follow-up on current diagnosis.  Overnight 

uneventful.  Patient denies any chest pain, shortness breath, nausea/vomiting 

or severe headaches.  Imaging, nursing note, chart, labs and old chart 

reviewed.  Discussed with patient.








Hospitalist Physical





- Physical exam


Narrative exam: 


GEN: WDWN, NAD, AWAKE, ALERT, ORIENTATED 3


HEENT: NCAT, EOMI, PERRL, OP Clear


NECK: supple, no adenopathy, no thyromegaly, no JVD


CVS/HEART: RRR, NORMAL S1S2, NO JVD, pulses present bilaterally


CHEST/LUNGS: Crackles bilaterally, Symmetrical chest expansion, good air entry 

bilaterally


GI/Abdomen: soft, NTND, good bowel sounds, no guarding or rebound


/Bladder: no suprapubic tenderness, no CVA or paraspinal tenderness


EXT/Skin: no c/c/e, no obvious rash


MSK: FROM x 4


Neuro: CN 2-12 grossly intact, no new focal deficits


Psych: calm








- Constitutional


Vitals: 


 











Temp Pulse Resp BP Pulse Ox


 


 99.1 F   84   19   116/65   94 


 


 01/07/18 08:09  01/07/18 08:51  01/07/18 08:51  01/07/18 08:09  01/07/18 08:14














Results





- Labs


CBC & Chem 7: 


 01/07/18 07:52





 01/07/18 07:52


Labs: 


 Laboratory Last Values











WBC  10.7 K/mm3 (4.5-11.0)   01/07/18  07:52    


 


RBC  4.26 M/mm3 (3.65-5.03)   01/07/18  07:52    


 


Hgb  11.5 gm/dl (10.1-14.3)   01/07/18  07:52    


 


Hct  35.8 % (30.3-42.9)   01/07/18  07:52    


 


MCV  84 fl (79-97)   01/07/18  07:52    


 


MCH  27 pg (28-32)  L  01/07/18  07:52    


 


MCHC  32 % (30-34)   01/07/18  07:52    


 


RDW  13.8 % (13.2-15.2)   01/07/18  07:52    


 


Plt Count  453 K/mm3 (140-440)  H  01/07/18  07:52    


 


Add Manual Diff  Complete   01/06/18  06:31    


 


Total Counted  100   01/06/18  06:31    


 


Seg Neutrophils %  Np   01/04/18  20:38    


 


Seg Neuts % (Manual)  56.0 % (40.0-70.0)   01/06/18  06:31    


 


Band Neutrophils %  8.0 %  01/06/18  06:31    


 


Lymphocytes % (Manual)  19.0 % (13.4-35.0)   01/06/18  06:31    


 


Reactive Lymphs % (Man)  0 %  01/06/18  06:31    


 


Monocytes % (Manual)  15.0 % (0.0-7.3)  H  01/06/18  06:31    


 


Eosinophils % (Manual)  0 % (0.0-4.3)   01/06/18  06:31    


 


Basophils % (Manual)  0 % (0.0-1.8)   01/06/18  06:31    


 


Metamyelocytes %  2.0 %  01/06/18  06:31    


 


Myelocytes %  0 %  01/06/18  06:31    


 


Promyelocytes %  0 %  01/06/18  06:31    


 


Blast Cells %  0 %  01/06/18  06:31    


 


Nucleated RBC %  Not Reportable   01/06/18  06:31    


 


Seg Neutrophils # Man  6.2 K/mm3 (1.8-7.7)   01/06/18  06:31    


 


Band Neutrophils #  0.9 K/mm3  01/06/18  06:31    


 


Lymphocytes # (Manual)  2.1 K/mm3 (1.2-5.4)   01/06/18  06:31    


 


Abs React Lymphs (Man)  0.0 K/mm3  01/06/18  06:31    


 


Monocytes # (Manual)  1.7 K/mm3 (0.0-0.8)  H  01/06/18  06:31    


 


Eosinophils # (Manual)  0.0 K/mm3 (0.0-0.4)   01/06/18  06:31    


 


Basophils # (Manual)  0.0 K/mm3 (0.0-0.1)   01/06/18  06:31    


 


Metamyelocytes #  0.2 K/mm3  01/06/18  06:31    


 


Myelocytes #  0.0 K/mm3  01/06/18  06:31    


 


Promyelocytes #  0.0 K/mm3  01/06/18  06:31    


 


Blast Cells #  0.0 K/mm3  01/06/18  06:31    


 


WBC Morphology  Not Reportable   01/06/18  06:31    


 


Hypersegmented Neuts  Not Reportable   01/06/18  06:31    


 


Hyposegmented Neuts  Not Reportable   01/06/18  06:31    


 


Hypogranular Neuts  Not Reportable   01/06/18  06:31    


 


Smudge Cells  Not Reportable   01/06/18  06:31    


 


Toxic Granulation  Not Reportable   01/06/18  06:31    


 


Toxic Vacuolation  Not Reportable   01/06/18  06:31    


 


Dohle Bodies  Not Reportable   01/06/18  06:31    


 


Pelger-Huet Anomaly  Not Reportable   01/06/18  06:31    


 


Jimbo Rods  Not Reportable   01/06/18  06:31    


 


Platelet Estimate  Appears normal   01/06/18  06:31    


 


Clumped Platelets  Not Reportable   01/06/18  06:31    


 


Plt Clumps, EDTA  Not Reportable   01/06/18  06:31    


 


Large Platelets  Few   01/06/18  06:31    


 


Giant Platelets  Not Reportable   01/06/18  06:31    


 


Platelet Satelliting  Not Reportable   01/06/18  06:31    


 


Plt Morphology Comment  Not Reportable   01/06/18  06:31    


 


RBC Morphology  Not Reportable   01/06/18  06:31    


 


Dimorphic RBCs  Not Reportable   01/06/18  06:31    


 


Polychromasia  Not Reportable   01/06/18  06:31    


 


Hypochromasia  Not Reportable   01/06/18  06:31    


 


Poikilocytosis  Not Reportable   01/06/18  06:31    


 


Anisocytosis  Few   01/06/18  06:31    


 


Microcytosis  Not Reportable   01/06/18  06:31    


 


Macrocytosis  Not Reportable   01/06/18  06:31    


 


Spherocytes  Few   01/06/18  06:31    


 


Pappenheimer Bodies  Not Reportable   01/06/18  06:31    


 


Sickle Cells  Not Reportable   01/06/18  06:31    


 


Target Cells  Not Reportable   01/06/18  06:31    


 


Tear Drop Cells  Not Reportable   01/06/18  06:31    


 


Ovalocytes  Not Reportable   01/06/18  06:31    


 


Helmet Cells  Not Reportable   01/06/18  06:31    


 


[place] Bodies  Not Reportable   01/06/18  06:31    


 


Cabot Rings  Not Reportable   01/06/18  06:31    


 


[place] Cells  Not Reportable   01/06/18  06:31    


 


Bite Cells  Not Reportable   01/06/18  06:31    


 


Crenated Cell  Not Reportable   01/06/18  06:31    


 


Elliptocytes  Not Reportable   01/06/18  06:31    


 


Acanthocytes (Spur)  Not Reportable   01/06/18  06:31    


 


Rouleaux  Not Reportable   01/06/18  06:31    


 


Hemoglobin C Crystals  Not Reportable   01/06/18  06:31    


 


Schistocytes  Not Reportable   01/06/18  06:31    


 


Malaria parasites  Not Reportable   01/06/18  06:31    


 


Yayo Bodies  Not Reportable   01/06/18  06:31    


 


Hem Pathologist Commnt  No   01/06/18  06:31    


 


PT  14.9 Sec. (12.2-14.9)   01/04/18  20:44    


 


INR  1.11  (0.87-1.13)   01/04/18  20:44    


 


APTT  29.8 Sec. (24.2-36.6)   01/04/18  20:44    


 


D-Dimer  758.89 ng/mlDDU (0-234)  H  01/04/18  20:44    


 


POC ABG pH  7.558  (7.35-7.45)  H  01/04/18  21:08    


 


POC ABG pCO2  28.6  (35-45)  L  01/04/18  21:08    


 


POC ABG pO2  76  ()  L  01/04/18  21:08    


 


POC ABG HCO3  25.5   01/04/18  21:08    


 


POC ABG Total CO2  26   01/04/18  21:08    


 


POC ABG O2 Sat  97   01/04/18  21:08    


 


POC ABG Base Excess  3   01/04/18  21:08    


 


FiO2  28 %  01/04/18  21:08    


 


Sodium  142 mmol/L (137-145)   01/07/18  07:52    


 


Potassium  3.5 mmol/L (3.6-5.0)  L  01/07/18  07:52    


 


Chloride  98.3 mmol/L ()   01/07/18  07:52    


 


Carbon Dioxide  27 mmol/L (22-30)   01/07/18  07:52    


 


Anion Gap  20 mmol/L  01/07/18  07:52    


 


BUN  12 mg/dL (7-17)   01/07/18  07:52    


 


Creatinine  0.6 mg/dL (0.7-1.2)  L  01/07/18  07:52    


 


Estimated GFR  > 60 ml/min  01/07/18  07:52    


 


BUN/Creatinine Ratio  20 %  01/07/18  07:52    


 


Glucose  88 mg/dL ()   01/07/18  07:52    


 


Calcium  9.1 mg/dL (8.4-10.2)   01/07/18  07:52    


 


Total Bilirubin  0.50 mg/dL (0.1-1.2)   01/04/18  20:45    


 


AST  44 units/L (5-40)  H  01/04/18  20:45    


 


ALT  38 units/L (7-56)   01/04/18  20:45    


 


Alkaline Phosphatase  76 units/L ()   01/04/18  20:45    


 


Troponin T  < 0.010 ng/mL (0.00-0.029)   01/05/18  02:16    


 


NT-Pro-B Natriuret Pep  62.68 pg/mL (0-900)   01/04/18  20:45    


 


Total Protein  7.6 g/dL (6.3-8.2)   01/04/18  20:45    


 


Albumin  3.3 g/dL (3.9-5)  L  01/04/18  20:45    


 


Albumin/Globulin Ratio  0.8 %  01/04/18  20:45    


 


Urine Color  Yellow  (Yellow)   01/04/18  Unknown


 


Urine Turbidity  Clear  (Clear)   01/04/18  Unknown


 


Urine pH  5.0  (5.0-7.0)   01/04/18  Unknown


 


Ur Specific Gravity  1.018  (1.003-1.030)   01/04/18  Unknown


 


Urine Protein  <15 mg/dl mg/dL (Negative)   01/04/18  Unknown


 


Urine Glucose (UA)  Neg mg/dL (Negative)   01/04/18  Unknown


 


Urine Ketones  Neg mg/dL (Negative)   01/04/18  Unknown


 


Urine Blood  Sm  (Negative)   01/04/18  Unknown


 


Urine Nitrite  Neg  (Negative)   01/04/18  Unknown


 


Urine Bilirubin  Neg  (Negative)   01/04/18  Unknown


 


Urine Urobilinogen  < 2.0 mg/dL (<2.0)   01/04/18  Unknown


 


Ur Leukocyte Esterase  Tr  (Negative)   01/04/18  Unknown


 


Urine WBC (Auto)  3.0 /HPF (0.0-6.0)   01/04/18  Unknown


 


Urine RBC (Auto)  3.0 /HPF (0.0-6.0)   01/04/18  Unknown


 


U Epithel Cells (Auto)  2.0 /HPF (0-13.0)   01/04/18  Unknown


 


Urine Mucus  Few /HPF  01/04/18  Unknown

## 2018-01-07 NOTE — PROGRESS NOTE
Assessment and Plan


Assessment and plan: 


Patient is 58-year-old woman with history of hypertension, smoker (quit last 

month) ovarian and stomach cancer in remission who present from [place] [facility] 

care with sob and cough.  Patient had the Flu and was treated with Tamiflu, 1-2 

weeks prior to presentation.





Chest x-ray reported as mild pulmonary vascular congestion


CTA of the chest reported as minimal infiltrate in the left lower lung with 

slight reactive lung tissue and atelectasis right middle and lower lobe.  No 

effusion or pneumothorax, there is no evidence of pulmonary artery emboli.





-Acute hypoxic respiratory failure, patient was on 85% on room air at rest: 

Treatment will oxygen


-Sepsis bilateral aspiration pneumonia: Careful with IV fluids due to venous 

pulmonary congestion, follow cultures


-Left lower lobe pneumonia:  Treatment with antibiotics 


-Hypokalemia: Replace and recheck in a.m.


-DVT prophylaxis: Subcutaneous Lovenox





1/5/18: 85% ra


1/6/18: 87% ra, will stop fluids, give one dose of 1v lasix lasix and recheck 

pulse ox tomorrow, if off o2 then d/c home or arrange for home o2 on monday 1/7/18: SPO2 with O2 92%, without O2 87%, with out O2 on ambulation 85%, 

ambulation with 2lts O2 91%, home tomorrow with O2 suspect component of Asthma. 

She needs testing for COPD





History


Interval history: 


Patient was seen and examined.  Follow-up on current diagnosis.  Overnight 

uneventful.  Patient denies any chest pain, shortness breath, nausea/vomiting 

or severe headaches.  Imaging, nursing note, chart, labs and old chart 

reviewed.  Discussed with patient.








Hospitalist Physical





- Physical exam


Narrative exam: 


GEN: WDWN, NAD, AWAKE, ALERT, ORIENTATED 3


HEENT: NCAT, EOMI, PERRL, OP Clear


NECK: supple, no adenopathy, no thyromegaly, no JVD


CVS/HEART: RRR, NORMAL S1S2, NO JVD, pulses present bilaterally


CHEST/LUNGS: Crackles bilaterally, Symmetrical chest expansion, good air entry 

bilaterally


GI/Abdomen: soft, NTND, good bowel sounds, no guarding or rebound


/Bladder: no suprapubic tenderness, no CVA or paraspinal tenderness


EXT/Skin: no c/c/e, no obvious rash


MSK: FROM x 4


Neuro: CN 2-12 grossly intact, no new focal deficits


Psych: calm








- Constitutional


Vitals: 


 











Temp Pulse Resp BP Pulse Ox


 


 99.1 F   84   19   116/65   94 


 


 01/07/18 08:09  01/07/18 08:51  01/07/18 08:51  01/07/18 08:09  01/07/18 08:14














Results





- Labs


CBC & Chem 7: 


 01/07/18 07:52





 01/07/18 07:52


Labs: 


 Laboratory Last Values











WBC  10.7 K/mm3 (4.5-11.0)   01/07/18  07:52    


 


RBC  4.26 M/mm3 (3.65-5.03)   01/07/18  07:52    


 


Hgb  11.5 gm/dl (10.1-14.3)   01/07/18  07:52    


 


Hct  35.8 % (30.3-42.9)   01/07/18  07:52    


 


MCV  84 fl (79-97)   01/07/18  07:52    


 


MCH  27 pg (28-32)  L  01/07/18  07:52    


 


MCHC  32 % (30-34)   01/07/18  07:52    


 


RDW  13.8 % (13.2-15.2)   01/07/18  07:52    


 


Plt Count  453 K/mm3 (140-440)  H  01/07/18  07:52    


 


Add Manual Diff  Complete   01/06/18  06:31    


 


Total Counted  100   01/06/18  06:31    


 


Seg Neutrophils %  Np   01/04/18  20:38    


 


Seg Neuts % (Manual)  56.0 % (40.0-70.0)   01/06/18  06:31    


 


Band Neutrophils %  8.0 %  01/06/18  06:31    


 


Lymphocytes % (Manual)  19.0 % (13.4-35.0)   01/06/18  06:31    


 


Reactive Lymphs % (Man)  0 %  01/06/18  06:31    


 


Monocytes % (Manual)  15.0 % (0.0-7.3)  H  01/06/18  06:31    


 


Eosinophils % (Manual)  0 % (0.0-4.3)   01/06/18  06:31    


 


Basophils % (Manual)  0 % (0.0-1.8)   01/06/18  06:31    


 


Metamyelocytes %  2.0 %  01/06/18  06:31    


 


Myelocytes %  0 %  01/06/18  06:31    


 


Promyelocytes %  0 %  01/06/18  06:31    


 


Blast Cells %  0 %  01/06/18  06:31    


 


Nucleated RBC %  Not Reportable   01/06/18  06:31    


 


Seg Neutrophils # Man  6.2 K/mm3 (1.8-7.7)   01/06/18  06:31    


 


Band Neutrophils #  0.9 K/mm3  01/06/18  06:31    


 


Lymphocytes # (Manual)  2.1 K/mm3 (1.2-5.4)   01/06/18  06:31    


 


Abs React Lymphs (Man)  0.0 K/mm3  01/06/18  06:31    


 


Monocytes # (Manual)  1.7 K/mm3 (0.0-0.8)  H  01/06/18  06:31    


 


Eosinophils # (Manual)  0.0 K/mm3 (0.0-0.4)   01/06/18  06:31    


 


Basophils # (Manual)  0.0 K/mm3 (0.0-0.1)   01/06/18  06:31    


 


Metamyelocytes #  0.2 K/mm3  01/06/18  06:31    


 


Myelocytes #  0.0 K/mm3  01/06/18  06:31    


 


Promyelocytes #  0.0 K/mm3  01/06/18  06:31    


 


Blast Cells #  0.0 K/mm3  01/06/18  06:31    


 


WBC Morphology  Not Reportable   01/06/18  06:31    


 


Hypersegmented Neuts  Not Reportable   01/06/18  06:31    


 


Hyposegmented Neuts  Not Reportable   01/06/18  06:31    


 


Hypogranular Neuts  Not Reportable   01/06/18  06:31    


 


Smudge Cells  Not Reportable   01/06/18  06:31    


 


Toxic Granulation  Not Reportable   01/06/18  06:31    


 


Toxic Vacuolation  Not Reportable   01/06/18  06:31    


 


Dohle Bodies  Not Reportable   01/06/18  06:31    


 


Pelger-Huet Anomaly  Not Reportable   01/06/18  06:31    


 


Jimbo Rods  Not Reportable   01/06/18  06:31    


 


Platelet Estimate  Appears normal   01/06/18  06:31    


 


Clumped Platelets  Not Reportable   01/06/18  06:31    


 


Plt Clumps, EDTA  Not Reportable   01/06/18  06:31    


 


Large Platelets  Few   01/06/18  06:31    


 


Giant Platelets  Not Reportable   01/06/18  06:31    


 


Platelet Satelliting  Not Reportable   01/06/18  06:31    


 


Plt Morphology Comment  Not Reportable   01/06/18  06:31    


 


RBC Morphology  Not Reportable   01/06/18  06:31    


 


Dimorphic RBCs  Not Reportable   01/06/18  06:31    


 


Polychromasia  Not Reportable   01/06/18  06:31    


 


Hypochromasia  Not Reportable   01/06/18  06:31    


 


Poikilocytosis  Not Reportable   01/06/18  06:31    


 


Anisocytosis  Few   01/06/18  06:31    


 


Microcytosis  Not Reportable   01/06/18  06:31    


 


Macrocytosis  Not Reportable   01/06/18  06:31    


 


Spherocytes  Few   01/06/18  06:31    


 


Pappenheimer Bodies  Not Reportable   01/06/18  06:31    


 


Sickle Cells  Not Reportable   01/06/18  06:31    


 


Target Cells  Not Reportable   01/06/18  06:31    


 


Tear Drop Cells  Not Reportable   01/06/18  06:31    


 


Ovalocytes  Not Reportable   01/06/18  06:31    


 


Helmet Cells  Not Reportable   01/06/18  06:31    


 


[place] Bodies  Not Reportable   01/06/18  06:31    


 


Cabot Rings  Not Reportable   01/06/18  06:31    


 


[place] Cells  Not Reportable   01/06/18  06:31    


 


Bite Cells  Not Reportable   01/06/18  06:31    


 


Crenated Cell  Not Reportable   01/06/18  06:31    


 


Elliptocytes  Not Reportable   01/06/18  06:31    


 


Acanthocytes (Spur)  Not Reportable   01/06/18  06:31    


 


Rouleaux  Not Reportable   01/06/18  06:31    


 


Hemoglobin C Crystals  Not Reportable   01/06/18  06:31    


 


Schistocytes  Not Reportable   01/06/18  06:31    


 


Malaria parasites  Not Reportable   01/06/18  06:31    


 


Yayo Bodies  Not Reportable   01/06/18  06:31    


 


Hem Pathologist Commnt  No   01/06/18  06:31    


 


PT  14.9 Sec. (12.2-14.9)   01/04/18  20:44    


 


INR  1.11  (0.87-1.13)   01/04/18  20:44    


 


APTT  29.8 Sec. (24.2-36.6)   01/04/18  20:44    


 


D-Dimer  758.89 ng/mlDDU (0-234)  H  01/04/18  20:44    


 


POC ABG pH  7.558  (7.35-7.45)  H  01/04/18  21:08    


 


POC ABG pCO2  28.6  (35-45)  L  01/04/18  21:08    


 


POC ABG pO2  76  ()  L  01/04/18  21:08    


 


POC ABG HCO3  25.5   01/04/18  21:08    


 


POC ABG Total CO2  26   01/04/18  21:08    


 


POC ABG O2 Sat  97   01/04/18  21:08    


 


POC ABG Base Excess  3   01/04/18  21:08    


 


FiO2  28 %  01/04/18  21:08    


 


Sodium  142 mmol/L (137-145)   01/07/18  07:52    


 


Potassium  3.5 mmol/L (3.6-5.0)  L  01/07/18  07:52    


 


Chloride  98.3 mmol/L ()   01/07/18  07:52    


 


Carbon Dioxide  27 mmol/L (22-30)   01/07/18  07:52    


 


Anion Gap  20 mmol/L  01/07/18  07:52    


 


BUN  12 mg/dL (7-17)   01/07/18  07:52    


 


Creatinine  0.6 mg/dL (0.7-1.2)  L  01/07/18  07:52    


 


Estimated GFR  > 60 ml/min  01/07/18  07:52    


 


BUN/Creatinine Ratio  20 %  01/07/18  07:52    


 


Glucose  88 mg/dL ()   01/07/18  07:52    


 


Calcium  9.1 mg/dL (8.4-10.2)   01/07/18  07:52    


 


Total Bilirubin  0.50 mg/dL (0.1-1.2)   01/04/18  20:45    


 


AST  44 units/L (5-40)  H  01/04/18  20:45    


 


ALT  38 units/L (7-56)   01/04/18  20:45    


 


Alkaline Phosphatase  76 units/L ()   01/04/18  20:45    


 


Troponin T  < 0.010 ng/mL (0.00-0.029)   01/05/18  02:16    


 


NT-Pro-B Natriuret Pep  62.68 pg/mL (0-900)   01/04/18  20:45    


 


Total Protein  7.6 g/dL (6.3-8.2)   01/04/18  20:45    


 


Albumin  3.3 g/dL (3.9-5)  L  01/04/18  20:45    


 


Albumin/Globulin Ratio  0.8 %  01/04/18  20:45    


 


Urine Color  Yellow  (Yellow)   01/04/18  Unknown


 


Urine Turbidity  Clear  (Clear)   01/04/18  Unknown


 


Urine pH  5.0  (5.0-7.0)   01/04/18  Unknown


 


Ur Specific Gravity  1.018  (1.003-1.030)   01/04/18  Unknown


 


Urine Protein  <15 mg/dl mg/dL (Negative)   01/04/18  Unknown


 


Urine Glucose (UA)  Neg mg/dL (Negative)   01/04/18  Unknown


 


Urine Ketones  Neg mg/dL (Negative)   01/04/18  Unknown


 


Urine Blood  Sm  (Negative)   01/04/18  Unknown


 


Urine Nitrite  Neg  (Negative)   01/04/18  Unknown


 


Urine Bilirubin  Neg  (Negative)   01/04/18  Unknown


 


Urine Urobilinogen  < 2.0 mg/dL (<2.0)   01/04/18  Unknown


 


Ur Leukocyte Esterase  Tr  (Negative)   01/04/18  Unknown


 


Urine WBC (Auto)  3.0 /HPF (0.0-6.0)   01/04/18  Unknown


 


Urine RBC (Auto)  3.0 /HPF (0.0-6.0)   01/04/18  Unknown


 


U Epithel Cells (Auto)  2.0 /HPF (0-13.0)   01/04/18  Unknown


 


Urine Mucus  Few /HPF  01/04/18  Unknown

## 2018-01-08 VITALS — SYSTOLIC BLOOD PRESSURE: 122 MMHG | DIASTOLIC BLOOD PRESSURE: 56 MMHG

## 2018-01-08 RX ADMIN — IPRATROPIUM BROMIDE AND ALBUTEROL SULFATE SCH AMPUL: .5; 3 SOLUTION RESPIRATORY (INHALATION) at 15:03

## 2018-01-08 RX ADMIN — IPRATROPIUM BROMIDE AND ALBUTEROL SULFATE SCH AMPUL: .5; 3 SOLUTION RESPIRATORY (INHALATION) at 08:26

## 2018-01-08 RX ADMIN — ENOXAPARIN SODIUM SCH MG: 100 INJECTION SUBCUTANEOUS at 09:10

## 2018-01-08 NOTE — DISCHARGE SUMMARY
Providers





- Providers


Date of Admission: 


01/05/18 03:00





Date of discharge: 01/08/18


Attending physician: 


MELONY SCHMIDT





Primary care physician: 


PASQUALE NORTH








Hospitalization


Condition: Stable


Hospital course: 


Patient is 58-year-old woman with history of hypertension, smoker (quit last 

month) ovarian and stomach cancer in remission who present from [place] [facility] 

[facility] with sob and cough.  Patient had the Flu and was treated with Tamiflu, 1-2 

weeks prior to presentation.





Chest x-ray reported as mild pulmonary vascular congestion


CTA of the chest reported as minimal infiltrate in the left lower lung with 

slight reactive lung tissue and atelectasis right middle and lower lobe.  No 

effusion or pneumothorax, there is no evidence of pulmonary artery emboli.





-Acute hypoxic respiratory failure suspected due to Pna with underlying COPD/

Asthma component, patient was on 85% on room air at rest: Treatment will oxygen


-Sepsis bilateral aspiration pneumonia: Careful with IV fluids due to venous 

pulmonary congestion, follow cultures


-Suspected Asthmatic bronchitis vs copd


-Left lower lobe pneumonia:  Treatment with antibiotics 


-Hypokalemia: Replace and recheck in a.m.


-DVT prophylaxis: Subcutaneous Lovenox





1/5/18: 85% ra


1/6/18: 87% ra, will stop fluids, give one dose of 1v lasix lasix and recheck 

pulse ox tomorrow, if off o2 then d/c home or arrange for home o2 on monday 1/7/18: SPO2 with O2 92%, without O2 87%, with out O2 on ambulation 85%, 

ambulation with 2lts O2 91%, home tomorrow with O2 suspect component of Asthma. 

She needs testing for COPD





Disposition: DC-01 TO HOME OR SELFCARE


Time spent for discharge: 35 minutes





Core Measure Documentation





- Palliative Care


Palliative Care/ Comfort Measures: Not Applicable





- Core Measures


Any of the following diagnoses?: none





- VTE Discharge Requirements


Deep Vein Thrombosis/Pulmonary Embolism Present on Admission: No


Has pt received <5 days of overlap therapy or INR<2.0: No


Anticoagulant overlap therapy prescribed at discharge: No


Contraindication No Overlap Therapy order at DC: Not Indicated





Exam





- Physical Exam


Narrative exam: 


GEN: WDWN, NAD, AWAKE, ALERT, ORIENTATED 3


HEENT: NCAT, EOMI, PERRL, OP Clear


NECK: supple, no adenopathy, no thyromegaly, no JVD


CVS/HEART: RRR, NORMAL S1S2, NO JVD, pulses present bilaterally


CHEST/LUNGS: Crackles bilaterally, Symmetrical chest expansion, good air entry 

bilaterally


GI/Abdomen: soft, NTND, good bowel sounds, no guarding or rebound


/Bladder: no suprapubic tenderness, no CVA or paraspinal tenderness


EXT/Skin: no c/c/e, no obvious rash


MSK: FROM x 4


Neuro: CN 2-12 grossly intact, no new focal deficits


Psych: calm








- Constitutional


Vitals: 


 











Temp Pulse Resp BP Pulse Ox


 


 98.9 F   80   18   120/65   96 


 


 01/08/18 07:37  01/08/18 08:43  01/08/18 08:43  01/08/18 07:37  01/08/18 08:29














Plan


Activity: other (no strenous activity until cleared by pcp)


Diet: low salt


Special Instructions: home hospice


Durable Medical Equipment Needed Upon Discharge: Oxygen (2 liters), Nebulizer


Additional Instructions: Please see Dr. Conti to be tested for COPD and asthma


Follow up with: 


PASQUALE NORTH MD [Primary Care Provider] - 3-5 Days


BANDAR BRIGGS MD [Staff Physician] - 7 Days


Prescriptions: 


guaiFENesin/CODEINE [Robitussin AC] 10 ml PO Q4H PRN #7 day


 PRN Reason: Cough


Levofloxacin [Levaquin TAB] 750 mg PO Q24H #5 day


methylPREDNISolone [Medrol Dose Chace] 1 dose PO DAILY #1 pack


Ipratropium/Albuterol Sulfate [DUONEB *Not for PRN Use*] 1 ampul IH TIDRT #30 

day